# Patient Record
Sex: MALE | Race: WHITE | NOT HISPANIC OR LATINO | URBAN - METROPOLITAN AREA
[De-identification: names, ages, dates, MRNs, and addresses within clinical notes are randomized per-mention and may not be internally consistent; named-entity substitution may affect disease eponyms.]

---

## 2024-05-29 ENCOUNTER — EMERGENCY (EMERGENCY)
Facility: HOSPITAL | Age: 24
LOS: 0 days | Discharge: HOME | End: 2024-05-29
Attending: EMERGENCY MEDICINE
Payer: COMMERCIAL

## 2024-05-29 VITALS
DIASTOLIC BLOOD PRESSURE: 68 MMHG | TEMPERATURE: 98 F | SYSTOLIC BLOOD PRESSURE: 137 MMHG | RESPIRATION RATE: 18 BRPM | OXYGEN SATURATION: 99 % | HEART RATE: 56 BPM

## 2024-05-29 VITALS
TEMPERATURE: 99 F | WEIGHT: 178.35 LBS | RESPIRATION RATE: 18 BRPM | DIASTOLIC BLOOD PRESSURE: 67 MMHG | OXYGEN SATURATION: 97 % | SYSTOLIC BLOOD PRESSURE: 86 MMHG | HEART RATE: 60 BPM

## 2024-05-29 DIAGNOSIS — M25.571 PAIN IN RIGHT ANKLE AND JOINTS OF RIGHT FOOT: ICD-10-CM
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
## 2024-05-29 DIAGNOSIS — S82.54XA NONDISPLACED FRACTURE OF MEDIAL MALLEOLUS OF RIGHT TIBIA, INITIAL ENCOUNTER FOR CLOSED FRACTURE: ICD-10-CM
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
## 2024-05-29 PROCEDURE — 99053 MED SERV 10PM-8AM 24 HR FAC: CPT

## 2024-05-29 PROCEDURE — 99285 EMERGENCY DEPT VISIT HI MDM: CPT

## 2024-05-29 PROCEDURE — 73610 X-RAY EXAM OF ANKLE: CPT | Mod: RT

## 2024-05-29 PROCEDURE — 73610 X-RAY EXAM OF ANKLE: CPT | Mod: 26,RT

## 2024-05-29 PROCEDURE — 99284 EMERGENCY DEPT VISIT MOD MDM: CPT | Mod: 25

## 2024-05-29 RX ORDER — IBUPROFEN 200 MG
600 TABLET ORAL ONCE
Refills: 0 | Status: COMPLETED | OUTPATIENT
Start: 2024-05-29 | End: 2024-05-29

## 2024-05-29 NOTE — ED ADULT NURSE NOTE - CHIEF COMPLAINT QUOTE
Pt ambulatory to ED with c/o right ankle pain x 2 days. 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 pt was seen and had scans done at urgent care. Pt is on crutches.

## 2024-05-29 NOTE — CONSULT NOTE ADULT - SUBJECTIVE AND OBJECTIVE BOX
23M Ukranian/Qatari speaking kamari reyes presents with R medial mal avulsion. Pirouetted 2d ago, noticed twinge in R medial ankle. No falls or other injuries since. Went to , was given crutches, and sent to ED for XRay findings. Came to ED for further evaluation and management. In ED, notes pain only at the medial malleolus. Able to bear weight. Has been using K-tape with some benefit. Patient denies any numbness, tingling, weakness, or any other orthopaedic complaint. Encounter performed with  [id number].    Exam:   T(C): 36.7 (05-29-24 @ 04:40), Max: 37 (05-29-24 @ 01:31)  T(F): 98 (05-29-24 @ 04:40), Max: 98.6 (05-29-24 @ 01:31)  HR: 56 (05-29-24 @ 04:40) (56 - 60)  BP: 137/68 (05-29-24 @ 04:40) (86/67 - 137/68)  RR: 18 (05-29-24 @ 04:40) (18 - 18)  SpO2: 99% (05-29-24 @ 04:40) (97% - 99%)    Gen: resting in bed, anxious  RLE:  Skin intact. No edema, erythema, or lesions of the skin. Minimal edema about medial malleolus.   TTP over medial malleolus, NTTP over deltoid, lateral malleolus, proximal fibula, or throughout the rest of the extremity. No laxity or hypermobility.   SILT L2-S1  GSC/TA/EHL/FHL intact  DP pulse palpable.  No calf tenderness bilaterally.  Compartments soft and compressible.       Laboratory Results:       Imaging: XRs reviewed demonstrating medial mal avulsion fracture

## 2024-05-29 NOTE — ED PROVIDER NOTE - OBJECTIVE STATEMENT
Pt is a 24 yo M without any medical problems presenting with right ankle pain and swelling X 2 days.  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  He went to an urgent care clinic and had an xray and was given crutches for a sprained ankle.  He was called with abnormal xray result of avulsion fracture of medial malleolus and has report on his cell phone and was told to go to the hospital.  he is able to bear weight without crutches.  Has been using kinetic tape with some relief.

## 2024-05-29 NOTE — ED PROVIDER NOTE - NSFOLLOWUPINSTRUCTIONS_ED_ALL_ED_FT
Log Out.  [employer] CareNotes®  :  [facility]        ANKLE FRACTURE - General Information    Ankle Fracture    WHAT YOU NEED TO KNOW:    What is an ankle fracture? An ankle fracture is a break in 1 or more of the bones in your ankle.  Foot Anatomy    What are the signs and symptoms of an ankle fracture?    Sudden, severe pain    Bruising on your ankle    Tenderness, redness, and swelling in your ankle    Trouble moving or putting weight on your ankle or foot    Weakness or numbness in your ankle    Deformed ankle shape  How is an ankle fracture diagnosed? Your healthcare provider will ask about your injury and examine you. An x-ray, ultrasound, CT, or MRI may show a fracture, tissue damage, or other injuries. You may be given contrast liquid to help the fracture show up better in the pictures. Tell the healthcare provider if you have ever had an allergic reaction to contrast liquid. Do not enter the MRI room with anything metal. Metal can cause serious injury. Tell the healthcare provider if you have any metal in or on your body.    How is an ankle fracture treated?    Acetaminophen decreases pain and fever. It is available without a doctor's order. Ask how much to take and how often to take it. Follow directions. Read the labels of all other medicines you are using to see if they also contain acetaminophen, or ask your doctor or pharmacist. Acetaminophen can cause liver damage if not taken correctly.    NSAIDs, such as ibuprofen, help decrease swelling, pain, and fever. This medicine is available with or without a doctor's order. NSAIDs can cause stomach bleeding or kidney problems in certain people. If you take blood thinner medicine, always ask your healthcare provider if NSAIDs are safe for you. Always read the medicine label and follow directions.    Prescription pain medicine may be given. Ask your healthcare provider how to take this medicine safely. Some prescription pain medicines contain acetaminophen. Do not take other medicines that contain acetaminophen without talking to your healthcare provider. Too much acetaminophen may cause liver damage. Prescription pain medicine may cause constipation. Ask your healthcare provider how to prevent or treat constipation.    Closed reduction may be done to put your bones back into their correct position without surgery.    Open reduction surgery is done when a closed reduction does not work or you have ligament damage. An incision is made and the bones and ligaments are put back in the correct position. This may include the use of special wires, pins, plates or screws.  How can I manage my symptoms?  R.I.C.E.    Rest your ankle so that it can heal. Return to normal activities as directed.    Apply ice on your ankle for 15 to 20 minutes every hour or as directed. Use an ice pack, or put crushed ice in a plastic bag. Cover it with a towel. Ice helps prevent tissue damage and decreases swelling and pain.    Use a support device, such as a brace, cast, or splint to limit your movement and protect your ankle. You may need to use crutches to protect your ankle and decrease your pain as you move around. Do not remove your device and do not put weight on your injured ankle.    Elevate your ankle above the level of your heart as often as you can. This will help decrease swelling and pain. Prop your ankle on pillows or blankets to keep it elevated comfortably.  Call your local emergency number (911 in the [place]) for any of the following:    You feel lightheaded, short of breath, and have chest pain.    You cough up blood.  When should I seek immediate care?    Your cast feels too tight.    Your leg feels warm, tender, and painful. It may look swollen and red.    Blood soaks through your bandage.    You have severe pain in your ankle.    Your foot or toes are cold or numb.    Your foot or toenails turn blue or gray.  When should I call my doctor?    Your splint feels too tight.    Your swelling has increased or returned.    You have a fever.    Your pain does not go away, even after treatment.    You have questions or concerns about your condition or care.  CARE AGREEMENT:    You have the right to help plan your care. Learn about your health condition and how it may be treated. Discuss treatment options with your healthcare providers to decide what care you want to receive. You always have the right to refuse treatment.    © [employer] 1973, 2024    	  back to top            © [employer] 1973, 2024 ANKLE FRACTURE - General Information    Ankle Fracture    WHAT YOU NEED TO KNOW:    What is an ankle fracture? An ankle fracture is a break in 1 or more of the bones in your ankle.  Foot Anatomy    What are the signs and symptoms of an ankle fracture?    Sudden, severe pain    Bruising on your ankle    Tenderness, redness, and swelling in your ankle    Trouble moving or putting weight on your ankle or foot    Weakness or numbness in your ankle    Deformed ankle shape  How is an ankle fracture diagnosed? Your healthcare provider will ask about your injury and examine you. An x-ray, ultrasound, CT, or MRI may show a fracture, tissue damage, or other injuries. You may be given contrast liquid to help the fracture show up better in the pictures. Tell the healthcare provider if you have ever had an allergic reaction to contrast liquid. Do not enter the MRI room with anything metal. Metal can cause serious injury. Tell the healthcare provider if you have any metal in or on your body.    How is an ankle fracture treated?    Acetaminophen decreases pain and fever. It is available without a doctor's order. Ask how much to take and how often to take it. Follow directions. Read the labels of all other medicines you are using to see if they also contain acetaminophen, or ask your doctor or pharmacist. Acetaminophen can cause liver damage if not taken correctly.    NSAIDs, such as ibuprofen, help decrease swelling, pain, and fever. This medicine is available with or without a doctor's order. NSAIDs can cause stomach bleeding or kidney problems in certain people. If you take blood thinner medicine, always ask your healthcare provider if NSAIDs are safe for you. Always read the medicine label and follow directions.    Prescription pain medicine may be given. Ask your healthcare provider how to take this medicine safely. Some prescription pain medicines contain acetaminophen. Do not take other medicines that contain acetaminophen without talking to your healthcare provider. Too much acetaminophen may cause liver damage. Prescription pain medicine may cause constipation. Ask your healthcare provider how to prevent or treat constipation.    Closed reduction may be done to put your bones back into their correct position without surgery.    Open reduction surgery is done when a closed reduction does not work or you have ligament damage. An incision is made and the bones and ligaments are put back in the correct position. This may include the use of special wires, pins, plates or screws.  How can I manage my symptoms?  R.I.C.E.    Rest your ankle so that it can heal. Return to normal activities as directed.    Apply ice on your ankle for 15 to 20 minutes every hour or as directed. Use an ice pack, or put crushed ice in a plastic bag. Cover it with a towel. Ice helps prevent tissue damage and decreases swelling and pain.    Use a support device, such as a brace, cast, or splint to limit your movement and protect your ankle. You may need to use crutches to protect your ankle and decrease your pain as you move around. Do not remove your device and do not put weight on your injured ankle.    Elevate your ankle above the level of your heart as often as you can. This will help decrease swelling and pain. Prop your ankle on pillows or blankets to keep it elevated comfortably.  Call your local emergency number (911 in the [place]) for any of the following:    You feel lightheaded, short of breath, and have chest pain.    You cough up blood.  When should I seek immediate care?    Your cast feels too tight.    Your leg feels warm, tender, and painful. It may look swollen and red.    Blood soaks through your bandage.    You have severe pain in your ankle.    Your foot or toes are cold or numb.    Your foot or toenails turn blue or gray.  When should I call my doctor?    Your splint feels too tight.    Your swelling has increased or returned.    You have a fever.    Your pain does not go away, even after treatment.    You have questions or concerns about your condition or care.  CARE AGREEMENT:    You have the right to help plan your care. Learn about your health condition and how it may be treated. Discuss treatment options with your healthcare providers to decide what care you want to receive. You always have the right to refuse treatment.    © [employer] 1973, 2024    	  back to top            © [employer] 1973, 2024

## 2024-05-29 NOTE — ED PROVIDER NOTE - PATIENT PORTAL LINK FT
You can access the FollowMyHealth Patient Portal offered by [facility] by registering at the following website: [email or web address]. By joining [facility]’s FollowMyHealth portal, you will also be able to view your health information using other applications (apps) compatible with our system.

## 2024-05-29 NOTE — CONSULT NOTE ADULT - ASSESSMENT
23M with R medial malleolus avulsion fracture    Imaging findings reviewed and discussed with the patient.   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 Also refused trilaminar splint for protection.   Air cast provided, recommended WBAT with no acrobatics; recommended RICE therapy with close orthopaedic follow up.   Patient states he is going to [place] for a carnival on Friday and has no permanent address. Advised follow up within 2w with healthcare provider.   Will discuss with Dr. Cherry and advise of any changes to the plan.

## 2024-05-29 NOTE — ED PROVIDER NOTE - PROGRESS NOTE DETAILS
JG: Ortho resident is at bedside.  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  Will recommend seeing orthopedic surgeon when he returns home within 1-2 weeks.  Keep splint in place.

## 2024-05-29 NOTE — ED ADULT NURSE NOTE - NSFALLRISKINTERV_ED_ALL_ED
Assistance OOB with selected safe patient handling equipment if applicable/Assistance with ambulation/Communicate fall risk and risk factors to all staff, patient, and family/Monitor gait and stability/Provide visual cue: yellow wristband, yellow gown, etc/Reinforce activity limits and safety measures with patient and family/Call bell, personal items and telephone in reach/Instruct patient to call for assistance before getting out of bed/chair/stretcher/Non-slip footwear applied when patient is off stretcher/[place] to call system/Physically safe environment - no spills, clutter or unnecessary equipment/Purposeful Proactive Rounding/Room/bathroom lighting operational, light cord in reach

## 2024-05-29 NOTE — ED ADULT NURSE NOTE - OBJECTIVE STATEMENT
Pt presents to ED w c/o R ankle pain. pt states he rolled his ankle 2 days ago with activity. saw outpatient urgent care and received scan and crutches but pain has no decreased. pt denies pain in any other locations. visitor at bedside, pt has no other concerns.  Pt A&O4 w clear speech and follows commands, ambulatory.

## 2024-05-29 NOTE — ED PROVIDER NOTE - MUSCULOSKELETAL, MLM
Spine appears normal, range of motion is not limited, +point tenderness to medial malleolus of right ankle with overlying swelling; neurovasc intact

## 2024-05-29 NOTE — ED PROVIDER NOTE - CARE PROVIDER_API CALL
Santhosh Berry  Orthopaedic Surgery  [address] [place] [id number]  Phone: [phone]  Fax: [phone]  Follow Up Time:

## 2024-05-29 NOTE — ED PROVIDER NOTE - CLINICAL SUMMARY MEDICAL DECISION MAKING FREE TEXT BOX
24 yo M with ankle injury and outpatient xray showing medial malleolus fracture.    Cannot view disc brought in by patient as it would not open on computer.     Xray of right ankle repeated and orthopedics consulted for fracture.